# Patient Record
Sex: FEMALE
[De-identification: names, ages, dates, MRNs, and addresses within clinical notes are randomized per-mention and may not be internally consistent; named-entity substitution may affect disease eponyms.]

---

## 2020-05-21 ENCOUNTER — HOSPITAL ENCOUNTER (OUTPATIENT)
Dept: HOSPITAL 92 - L&D/OP | Age: 30
Discharge: HOME HEALTH | End: 2020-05-21
Attending: FAMILY MEDICINE
Payer: COMMERCIAL

## 2020-05-21 VITALS — BODY MASS INDEX: 29.8 KG/M2

## 2020-05-21 DIAGNOSIS — O99.613: Primary | ICD-10-CM

## 2020-05-21 DIAGNOSIS — Z3A.37: ICD-10-CM

## 2020-05-21 DIAGNOSIS — Z88.6: ICD-10-CM

## 2020-05-21 DIAGNOSIS — K80.20: ICD-10-CM

## 2020-05-21 DIAGNOSIS — O24.415: ICD-10-CM

## 2020-05-21 DIAGNOSIS — Z79.84: ICD-10-CM

## 2020-05-21 LAB
ALBUMIN SERPL BCG-MCNC: 3.5 G/DL (ref 3.5–5)
ALP SERPL-CCNC: 95 U/L (ref 40–110)
ALT SERPL W P-5'-P-CCNC: 13 U/L (ref 8–55)
ANION GAP SERPL CALC-SCNC: 11 MMOL/L (ref 10–20)
AST SERPL-CCNC: 14 U/L (ref 5–34)
BILIRUB SERPL-MCNC: 0.4 MG/DL (ref 0.2–1.2)
BUN SERPL-MCNC: 7 MG/DL (ref 7–18.7)
CALCIUM SERPL-MCNC: 8.7 MG/DL (ref 7.8–10.44)
CHLORIDE SERPL-SCNC: 106 MMOL/L (ref 98–107)
CO2 SERPL-SCNC: 23 MMOL/L (ref 22–29)
CREAT CL PREDICTED SERPL C-G-VRATE: 195 ML/MIN (ref 70–130)
GLOBULIN SER CALC-MCNC: 3.2 G/DL (ref 2.4–3.5)
GLUCOSE SERPL-MCNC: 80 MG/DL (ref 70–105)
POTASSIUM SERPL-SCNC: 4 MMOL/L (ref 3.5–5.1)
SODIUM SERPL-SCNC: 136 MMOL/L (ref 136–145)

## 2020-05-21 PROCEDURE — 36416 COLLJ CAPILLARY BLOOD SPEC: CPT

## 2020-05-21 PROCEDURE — 76705 ECHO EXAM OF ABDOMEN: CPT

## 2020-05-21 PROCEDURE — 99284 EMERGENCY DEPT VISIT MOD MDM: CPT

## 2020-05-21 PROCEDURE — 80053 COMPREHEN METABOLIC PANEL: CPT

## 2020-05-21 PROCEDURE — 36415 COLL VENOUS BLD VENIPUNCTURE: CPT

## 2020-05-21 NOTE — PDOC.LDPN
Labor & Delivery Progress Note





- Subjective


Subjective: comfortable





- Objective


Vital signs reviewed and normal: yes


General: NAD, resting


SVE: 1/thick/high





- Assessment


(1) Third trimester pregnancy


Code(s): Z34.93 - ENCNTR FOR SUPRVSN OF NORMAL PREG, UNSP, THIRD TRIMESTER   

Current Visit: Yes   Status: Acute   





(2) Gestational diabetes


Code(s): O24.419 - GESTATIONAL DIABETES MELLITUS IN PREGNANCY, UNSP CONTROL   

Current Visit: Yes   Status: Acute   





(3) Abdominal pain


Code(s): R10.9 - UNSPECIFIED ABDOMINAL PAIN   Current Visit: Yes   Status: 

Acute   





(4) Cholelithiasis


Code(s): K80.20 - CALCULUS OF GALLBLADDER W/O CHOLECYSTITIS W/O OBSTRUCTION   

Current Visit: Yes   Status: Acute   


Plan: other (OBGYN Faculty: Aware of RUQ sono finding cholelithiasis. No CBD 

dilation or sono evidence of GB wall thickening. OK for outpat care with GB 

information provided.)


-: 





Pt is a 30 yo  at 37.5 wks by 1T U/S, KIM 37.5 wks, who presents for upper 

abdominal discomfort:





# Abdominal Discomfort


Likely GERD, r/o gallbladder etiology. Pawel on monitor - will reassess 

in 2 hours for change. 


- pending CMP, RUQ U/S


- famotidine for reflux


- tylenol for pain





# Term IUP


1, thich, high on check today. eIOL 20 for A2GDM.


- will recheck in 2 hours for progression of labor


- pending 3T labs from [facility]





# A2GDM


BG WNL


- continue metformin





# Cholelithiasis


- counseled pt, pt understands symptoms and return precautions





# Vaginal Discharge


Curd-like appearing, white, non-foul smelling


- discharge with monostat





Dispo: discharge to home with return precautions given





OBGYN Faculty:


Aware of GB findings. No CBD dilation or GB wall thickening. OK to follow up 

PP. Info provided by Resident team. CMP ok.

## 2020-05-21 NOTE — PDOC.FPROB
FMR OB H&P: HPI





- History of Present Illness


Chief Complaint: Abdominal Pain


Indentification: 


History of Present Illness: 





Pt is a 30 yo  at 37.5 weeks by 1T u/s, KIM who presents with upper 

abdominal pain/discomfort worsening since last night. She states it is her 

"upper stomach". The pain is constant, not alleviated by anything although she 

has not taken any medication.  She has not had a cholecystectomy but admitted 

to an appendectomy. She has a bad taste in her mouth.  She has PMH of reflux 

not taking medication. Endorsed 1 episode of emesis. She denies LOF, vaginal 

bleeding, intense/diffuse abdominal pain, dysuria, hematuria, vaginal 

discharge.  She does not know if these are contractions.





ALANNA - SILVESTRE Potts





FMR OB H&P: Current Pregnancy





- Prenatal Care


: 1


Para: 0


Gestational age: 37.5


Due date: 20


Dating Criteria: 1T U/S


Course/Complications: 





A2GDM





FMR OB H&P: History





- Past Medical History


PMH: 


GERD





- OB History


OB History: 





A2GDM





- GYN History


GYN History: 





Hx of Possible HPV requiring re-testing after delivery





- Surgical History


Sx History: 





Appendectomy 





- Social History


Social History: 





denies tobacco, drugs, alcohol. Smoked tobacco 5 years ago.





- Family History


Family History: 





non-contributory





FMR OB H&P: Medications





- Current


Home Medications: 


 











 Medication  Instructions  Recorded  Confirmed  Type


 


metFORMIN [Glucophage] 1 tab PO DAILY 20 History











Allergies/Adverse Reactions: 


 Allergies











Allergy/AdvReac Type Severity Reaction Status Date / Time


 


NSAIDS (Non-Steroidal Allergy   Verified 20 07:44





Anti-Inflamma     














FMR OB H&P: ROS





- Review of Systems


General: denies: fever/chills, weight/appetite/sleep changes


ENT: denies: nasal congestion, rhinorrhea


Cardiovascular: denies: chest pain, edema


Gastrointestinal: reports: abdominal pain, indigestion, nausea, vomiting.  

denies: cramping, diarrhea, constipation


Genitourinary (Female): denies: incontinence, dysuria, hematuria


Musculoskeletal: denies: pain, stiffness


Neurologic: denies: numbness, syncope


Integumentary: denies: rash, lesions


Hematologic/Lymphatic: denies: prolonged or excessive bleeding





FMR OB H&P: Vital Signs





- Maternal


Vital signs: 





BP WNL





- Fetal Heart Tones


Variability: moderate


Acceleration: present


Category: category 1


[place] contractions every: q3-4 mins





FMR OB H&P: Physical Exam





- Physical Exam


General: NAD, awake, alert and oriented


HEENT: PERRLA, EOMI


Neck: FROM, no JVD


Heart: RRR, normal S1/S2


General: CTAB, no respiratory distress, no wheezing


Abdomen: soft, gravid


Deviation from normal: mild tenderness in epigastric region, negative martinez's 

sign


Musculoskeletal: FROM in all four extremities


Neurological: cranial nerves II through XII intact, sensation to pain,touch and 

proprioception grossly normal


Skin: no rash, capillary refill <2 seconds


Lymphatic: no purpura, no petechia


Psychiatric: intact recent and remote memory, good judgement and insight





FMR OB H&P: Results





- Labs


Lab results: 


 Laboratory Results - last 24 hr











  20





  07:43


 


POC Glucose  86














FMR OB H&P: A/P





- Problem List


(1) Third trimester pregnancy


Current Visit: Yes   Status: Acute   Code(s): Z34.93 - ENCNTR FOR SUPRVSN OF 

NORMAL PREG, UNSP, THIRD TRIMESTER   





(2) Gestational diabetes


Current Visit: Yes   Status: Acute   Code(s): O24.419 - GESTATIONAL DIABETES 

MELLITUS IN PREGNANCY, UNSP CONTROL   





(3) Abdominal pain


Current Visit: Yes   Status: Acute   Code(s): R10.9 - UNSPECIFIED ABDOMINAL 

PAIN   


Disposition: 





Pt is a 30 yo  at 37.5 wks by 1T U/S, KIM 37.5 wks, who presents for upper 

abdominal discomfort:





# Abdominal Discomfort


Likely GERD, r/o gallbladder etiology. Pawel on monitor - will reassess 

in 2 hours for change. 


- pending CMP, RUQ U/S


- famotidine for reflux


- tylenol for pain





# Term IUP


1, thich, high on check today. eIOL 20 for A2GDM.


- will recheck in 2 hours for progression of labor


- pending 3T labs from [facility]





# A2GDM


BG WNL


- continue metformin





Dispo: pending workup, assess for cervical change in 2 hours.


Discussion: 


Date/Time: 20 0901











This H&P was discussed with Dr. Andersen and Dr. Jackson who agree with the above 

documentation and plan.

## 2020-05-21 NOTE — ULT
Exam:

Right upper quadrant ultrasound:



HISTORY:

Right-sided abdominal pain and right upper and right lower quadrant.



COMPARISON:

None



FINDINGS:

Liver: Within normal limits

Gallbladder: Echogenic foci are seen in the gallbladder lumen which demonstrate posterior shadowing w
ith additional areas of increased echogenicity. Findings are most likely attributable to

combination of gallbladder calculi, cholesterol crystals, and sludge within the gallbladder lumen. No
 gallbladder wall thickening or pericholecystic fluid is seen.

Common bile duct: The common duct is normal in caliber  measuring 0.3 cm in diameter.

Pancreas: Limited visualized portions of the pancreas demonstrate a normal sonographic appearance.



Right kidney: Right kidney demonstrates a normal sonographic appearance without evidence of hydroneph
rosis. The right kidney measures 11.5 cm in length.

IVC: The visualized IVC demonstrates a normal sonographic appearance.



Limited sonographic evaluation was obtained of the right lower quadrant region of patient's area of p
ain. There is a hypoechoic rounded structure measuring 4.2 cm x 3.7 cm x 2.2 cm. This structure

does not demonstrate flow on color flow or Doppler evaluation. The exact etiology of this structure i
s uncertain. This structure is located within the superior facial portion of the anterior right

lower quadrant. This structure does not elongate to suggest that this represents the appendix.



IMPRESSION:



1. Hypoechoic structure right lower quadrant region of patient's area of pain. This does not elongate
 or demonstrate a tubular configuration to definitely suggest that this represents the appendix.

The exact etiology is uncertain. Additional imaging with CT or MRI would be warranted for further gordo
luation of this structure. Appendicitis cannot be excluded based on this examination.

2. Cholelithiasis and gallbladder sludge. Common duct is normal in caliber.

3. No evidence of hydronephrosis



Reported By: Cleve Hoyos 

Electronically Signed:  5/21/2020 10:43 AM

## 2020-05-21 NOTE — HP
TIME OF EVALUATION:  The time of evaluation was roughly 0920 until 0940.  The 
time

is now 0944. 



LOCATION:  Triage bed A. 



In brief, this is a patient of the prenatal clinic and I have discussed the case

with Lai Waller, who is a resident on call. 



CHIEF COMPLAINT:  Possible gastroesophageal reflux symptoms (which have been

chronic). 



HISTORY OF PRESENT ILLNESS:  This is a 29-year-old G1 at 37 weeks and 5 days, 
who

has a scheduled induction of labor on May 30th at around 39 weeks.  She has A2

diabetes and she is on metformin.  She states that she has had this persistent

reflux symptom in her mid chest, but there is no shortness of breath and no true

chest pain.  There is no diaphoresis.  There is no loss of consciousness.  She 
also

has a "bad taste" in her mouth, which to her feels like "reflux."  There is no

leakage of fluid.  No contractions as well.  She notes a few contractions.  She 
does

not state that they are regular and that is not her presenting complaint. 



REVIEW OF SYSTEMS:  Complete review of systems was checked and is otherwise 
negative

unless specified in the HPI. 



PAST MEDICAL HISTORY:  Otherwise negative.



OB HISTORY:  She is a G1, P0.



PAST SURGICAL HISTORY:  Prior Appy.



SOCIAL HISTORY:  Negative for alcohol, tobacco, or drug use.



ALLERGIES:  NONE.



PHYSICAL EXAMINATION:

VITAL SIGNS:  She is afebrile and normotensive.  Blood sugar at bedside was 86. 

Abd soft and NT.

Her cervix is1, thick and high. 



FETAL MONITORS: Tocodynamometer shows irregular contractions about every 4 
minutes or so, and the

fetal heart tracing is reactive with accelerations above a baseline of about 130

with moderate variability.  There are no pathological decelerations.  



ASSESSMENT:  This is a G1, P0, at 37 weeks and 5 days, 29-year-old, with 
epigastric

reflux symptoms.  There are no symptoms of biliary colic or of true chest pain. 



PLAN:  

1. To be conservative, I have requested Dr. Waller get a complete metabolic 
profile

and a right upper quadrant ultrasound to rule out any atypical presentations of

hepatic conditions. 

2. We will check her cervix in 2 hours.

3. We will order Pepcid for conservative care.

4. I have seen the patient at bedside and she is clinically stable.

5. I have explained the plan to the patient in detail.







Job ID:  [id number]



[facility]

## 2020-05-28 ENCOUNTER — HOSPITAL ENCOUNTER (OUTPATIENT)
Dept: HOSPITAL 92 - L&D/OP | Age: 30
Discharge: HOME HEALTH | End: 2020-05-28
Payer: COMMERCIAL

## 2020-05-28 VITALS — DIASTOLIC BLOOD PRESSURE: 71 MMHG | SYSTOLIC BLOOD PRESSURE: 112 MMHG | TEMPERATURE: 98.2 F

## 2020-05-28 VITALS — BODY MASS INDEX: 30.9 KG/M2

## 2020-05-28 DIAGNOSIS — O36.8130: Primary | ICD-10-CM

## 2020-05-28 DIAGNOSIS — Z88.6: ICD-10-CM

## 2020-05-28 DIAGNOSIS — Z3A.38: ICD-10-CM

## 2020-05-28 DIAGNOSIS — O24.415: ICD-10-CM

## 2020-05-28 PROCEDURE — 76819 FETAL BIOPHYS PROFIL W/O NST: CPT

## 2020-05-28 PROCEDURE — 76815 OB US LIMITED FETUS(S): CPT

## 2020-05-28 PROCEDURE — 99283 EMERGENCY DEPT VISIT LOW MDM: CPT

## 2020-05-28 NOTE — ULT
EXAM:

Limited OB ultrasound



COMPARISON:

None



HISTORY:

Pregnant female. Evaluate fetal size. Decreased fetal movement..



TECHNIQUE: Multiplanar grayscale and color Doppler transabdominal sonographic images are obtained.



FINDINGS: There is a single intrauterine gestation in cephalic presentation. Cardiac Doppler demonstr
ates fetal heart tones with a fetal heart rate of 135 beats per minute. The placenta is located

maternal right without evidence of placenta previa. There is a normal amount of amniotic fluid with a
n amniotic fluid index of 10 centimeters. Cervix not visualized due to shadowing from fetal head.



Fetal biometry measurements:

BPD  8.95  cm -- 36 weeks 2 days

HC  33.32  cm -- 38 weeks

AC  33.51  cm -- 37 weeks 3 days

FL  7.2  cm -- 36 weeks 6 days



The estimated gestational age by ultrasound is 37 weeks 1 day with an KIM on6/17/2020. Gestational ag
e by the last menstrual period is not provided.



The estimated fetal weight by ultrasound is 3149 g (6 pounds, 15 ounces). 



This exam was not performed for evaluation of the fetal anatomical structures.



A score of 2 was obtained each for fetal tone, fetal breathing, fetal movements, and amniotic fluid v
olume



IMPRESSION:

1. Single intrauterine gestation in cephalic  presentation with fetal heart tones documented. Estimat
ed gestational age by ultrasound is 37 weeks 1 day with KIM on 6/17/2020.

2. Estimated fetal weight is 3149 g (6 pounds, 15 ounces).

3. Amniotic fluid index is 10 centimeters.

4. Total biophysical profile score of 8 out of 8 is obtained.



Reported By: Cleve Hoyos 

Electronically Signed:  5/28/2020 2:18 PM

## 2020-05-28 NOTE — PDOC.LDHP
Labor and Delivery H&P


Chief complaint: decreased fetal movement


HPI: 


30yo  @ 38.5 wks by LMP and 6.5 wk sono presents from clinic with 

complaint of decreased fetal movement x2 days and an incomplete BPP of 2. Pt 

states she has been feeling normal, denies any vaginal pain/bleeding/discharge, 

LOF. Does note feeling some very mild contractions intermittently. Pt has 

induction scheduled at 39 weeks. She is a GDM A2 and takes metformin 500 qam 

with glucose ranging from  recently.


Current gestational age (weeks): 38 (.5)


Dating criteria: last menstrual period, first trimester ultrasound


Grav: 1


Para: 0


Current pregnancy complications: gestational diabetes (A2)


Abnormal US findings: No


Current medications: pre- vitamins


Previous surgical history: appendectomy


Allergies/Adverse Reactions: 


 Allergies











Allergy/AdvReac Type Severity Reaction Status Date / Time


 


naproxen Allergy   Verified 20 12:34


 


NSAIDS (Non-Steroidal Allergy   Verified 20 07:44





Anti-Inflamma     











Social history: none





- Physical Exam


Vital signs reviewed and normal: yes


General: NAD


Heart: RRR


Lungs: nonlabored breathing


Abdomen: NTTP


Extremeties: normal range of motion


FHT: category 1, acceleration absent, variability present





- OB Labs


Blood type: B


RH: positive


Antibody Screen: negative


HIV: negative


RPR: negative


HEPSAg: negative


1 hour GCT: positive (171)


3 hour GTT: 69/174/155/118


GBS: negative


Rubella: immune


Additional Labs: 


hep c neg


A1c 5.3%





- Assessment


Encounter of 3rd Trimester Pregnancy - no abnormal findings





- Plan


-: 


Concern for reported abnormal BPP and Decreased Fetal Movement


BPP:


FRANCISCO: 10


NST: reactive


Growth: EFW 3149g, EGA 37.1





Pt reports no concerning symptoms. GDM appears well controlled. BPP done in the 

office today that was 2 was over a 20 minute timeframe and thus incomplete - 

no NST was performed at that time. Pt is considered stable for discharge and 

follow up on her induction date at 39 weeks. Discussed return precautions prior 

to discharge - pt and FOB expressed understanding.





Roshan Wilson PGY1





This case was discussed my attending, Dr. Scott Tabor, who agreed with 

the plan.





Addendum - Attending





- Attending Attestation


Date/Time: 20 2969





Seen and examined on date of service. BPP in office was reportedly only for 20 

minutes and no NST performed. Repeat growth and APT reassuring. D/c with follow 

up for induction @ 39/0.

## 2020-05-29 ENCOUNTER — HOSPITAL ENCOUNTER (OUTPATIENT)
Dept: HOSPITAL 92 - ERS | Age: 30
Discharge: HOME | End: 2020-05-29
Attending: FAMILY MEDICINE
Payer: MEDICAID

## 2020-05-29 DIAGNOSIS — Z01.812: Primary | ICD-10-CM

## 2020-05-29 DIAGNOSIS — Z11.59: ICD-10-CM

## 2020-05-29 PROCEDURE — U0003 INFECTIOUS AGENT DETECTION BY NUCLEIC ACID (DNA OR RNA); SEVERE ACUTE RESPIRATORY SYNDROME CORONAVIRUS 2 (SARS-COV-2) (CORONAVIRUS DISEASE [COVID-19]), AMPLIFIED PROBE TECHNIQUE, MAKING USE OF HIGH THROUGHPUT TECHNOLOGIES AS DESCRIBED BY CMS-2020-01-R: HCPCS

## 2020-05-29 PROCEDURE — 87635 SARS-COV-2 COVID-19 AMP PRB: CPT

## 2020-05-30 ENCOUNTER — HOSPITAL ENCOUNTER (INPATIENT)
Dept: HOSPITAL 92 - L&D | Age: 30
LOS: 3 days | Discharge: HOME | End: 2020-06-02
Attending: FAMILY MEDICINE | Admitting: FAMILY MEDICINE
Payer: COMMERCIAL

## 2020-05-30 VITALS — BODY MASS INDEX: 29.6 KG/M2

## 2020-05-30 DIAGNOSIS — Z79.84: ICD-10-CM

## 2020-05-30 DIAGNOSIS — Z3A.39: ICD-10-CM

## 2020-05-30 DIAGNOSIS — A63.0: ICD-10-CM

## 2020-05-30 LAB
HBSAG INDEX: 0.33 S/CO (ref 0–0.99)
HGB BLD-MCNC: 12.6 G/DL (ref 12–16)
MCH RBC QN AUTO: 29.2 PG (ref 27–31)
MCV RBC AUTO: 93.1 FL (ref 78–98)
PLATELET # BLD AUTO: 414 THOU/UL (ref 130–400)
RBC # BLD AUTO: 4.33 MILL/UL (ref 4.2–5.4)
WBC # BLD AUTO: 11.1 THOU/UL (ref 4.8–10.8)

## 2020-05-30 PROCEDURE — 82805 BLOOD GASES W/O2 SATURATION: CPT

## 2020-05-30 PROCEDURE — 86901 BLOOD TYPING SEROLOGIC RH(D): CPT

## 2020-05-30 PROCEDURE — U0003 INFECTIOUS AGENT DETECTION BY NUCLEIC ACID (DNA OR RNA); SEVERE ACUTE RESPIRATORY SYNDROME CORONAVIRUS 2 (SARS-COV-2) (CORONAVIRUS DISEASE [COVID-19]), AMPLIFIED PROBE TECHNIQUE, MAKING USE OF HIGH THROUGHPUT TECHNOLOGIES AS DESCRIBED BY CMS-2020-01-R: HCPCS

## 2020-05-30 PROCEDURE — 86850 RBC ANTIBODY SCREEN: CPT

## 2020-05-30 PROCEDURE — 36415 COLL VENOUS BLD VENIPUNCTURE: CPT

## 2020-05-30 PROCEDURE — 86762 RUBELLA ANTIBODY: CPT

## 2020-05-30 PROCEDURE — 85018 HEMOGLOBIN: CPT

## 2020-05-30 PROCEDURE — 86900 BLOOD TYPING SEROLOGIC ABO: CPT

## 2020-05-30 PROCEDURE — 87340 HEPATITIS B SURFACE AG IA: CPT

## 2020-05-30 PROCEDURE — 85027 COMPLETE CBC AUTOMATED: CPT

## 2020-05-30 PROCEDURE — 51702 INSERT TEMP BLADDER CATH: CPT

## 2020-05-30 PROCEDURE — 85014 HEMATOCRIT: CPT

## 2020-05-30 PROCEDURE — 36416 COLLJ CAPILLARY BLOOD SPEC: CPT

## 2020-05-30 PROCEDURE — 87635 SARS-COV-2 COVID-19 AMP PRB: CPT

## 2020-05-30 NOTE — PDOC.FPROB
FMR OB H&P: Medications





- Current


Home Medications: 


 











 Medication  Instructions  Recorded  Confirmed  Type


 


metFORMIN [Glucophage] 1 tab PO DAILY 20 History











Allergies/Adverse Reactions: 


 Allergies











Allergy/AdvReac Type Severity Reaction Status Date / Time


 


naproxen Allergy   Verified 20 20:25


 


NSAIDS (Non-Steroidal Allergy   Verified 20 20:25





Anti-Inflamma     














FMR OB H&P: A/P





- Problem List


(1) Gestational diabetes


Status: Acute   Code(s): O24.419 - GESTATIONAL DIABETES MELLITUS IN PREGNANCY, 

UNSP CONTROL   





(2) Third trimester pregnancy


Status: Acute   Code(s): Z34.93 - ENCNTR FOR SUPRVSN OF NORMAL PREG, UNSP, 

THIRD TRIMESTER   


Discussion: 


Date/Time: 20





PCP: Harry


 


HPI:


This is a 28 yo  presenting for induction 2/2 A2GDM.


She affirms fetal movement, denies cxns, ROM, bleeding.


Denies HA, visual changes, SOB, or swelling.





She had incomplete care as she moved here from [place] at 24 weeks but then 

returned from [place] for >1 month and has now returned.





History:


OB hx:  G1


Gyn: LSIL with HR HPV


PMH: denies hx HTN, DM, asthma


PSH: appendectomy


Meds: PNV, metformin 500mg BID


All:  ibuprofen, naproxen-> facial swelling


Soc Hx: denies smoking, alcohol, drugs


Fam Hx: denies downs, congenital birth defects





GBS: neg


Blood type: B+


Ab screen: neg


HIV: neg


RPR: neg


Hep B: neg


Rubella: immune


GC/CT: neg





REVIEW OF SYSTEMS: 


Gen: no fever, chills, or sweats


Neuro: no numbness/tingling, no weakness, denies headache


ENT: denies congestion


Eyes: no visual changes


Resp: denies cough, no production, no SOB, no wheeze


Card: denies chest pain, no palpitations


GI: denies nausea, vomiting, diarrhea


: no dysuria, no hematuria


Skin: no rash, no erythema


Psych: denies hx anxiety/depression





Vitals:  T: 98.5   R: 18   BP: 114/76 P:67 at: 98% on RA  





PHYSICAL EXAMINATION: 


General: NAD, alert and oriented x3


HEENT: EOMI, normal sclera


Neck: Supple. Full ROM.


Heart/Cardiovascular System: RRR, Cap refill < 3 seconds, no rub, no murmur


Lungs/Respiratory System: clear to auscultation bilaterally. No increased work 

of breathing. Room air.


Abdomen/Gastro-Intestinal System: no abdominal tenderness, normal bowel sounds, 

Gravid


Extremities: Warm extremities. No cyanosis or edema. 


Neuro: No gross deficits appreciated


Psychiatry: Awake, Alert and cooperative with exam


Skin: no lesions, no rashes


Musculoskeletal: Full ROM





A/P:





This is a 28 yo  here for induction 2/2 A2GDM


FHT: 140 baseline, mod variability, no decels, accels present


[place]: irregular contractions





# Pregnancy


-   SVE: cl/50%/-2


-   Cytotec induction


-   GBS neg





# A2GDM


-   Check glucose q4 hr, goal <126


-   Metformin 500mg BID


-                      3106g at 36.5wk US





# LSIL w/ HR HPV


-   Colpo 6 wk PP








Addendum - Attending





- Attending Attestation


Date/Time: 20





I personally evaluated the patient and discussed the management with Dr. Potts.


I agree with the History, Examination, Assessment and Plan documented above 

with any addition or exceptions noted below.

## 2020-05-31 LAB
ANALYZER IN CARDIO: (no result)
BASE EXCESS STD BLDA CALC-SCNC: -5 MEQ/L
HCO3 BLDA-SCNC: 23.1 MEQ/L (ref 22–28)

## 2020-05-31 PROCEDURE — 6A550ZT PHERESIS OF CORD BLOOD STEM CELLS, SINGLE: ICD-10-PCS | Performed by: FAMILY MEDICINE

## 2020-05-31 PROCEDURE — 3E033VJ INTRODUCTION OF OTHER HORMONE INTO PERIPHERAL VEIN, PERCUTANEOUS APPROACH: ICD-10-PCS | Performed by: FAMILY MEDICINE

## 2020-05-31 PROCEDURE — 0KQM0ZZ REPAIR PERINEUM MUSCLE, OPEN APPROACH: ICD-10-PCS | Performed by: FAMILY MEDICINE

## 2020-05-31 RX ADMIN — DOCUSATE CALCIUM SCH MG: 240 CAPSULE, LIQUID FILLED ORAL at 21:21

## 2020-05-31 NOTE — PDOC.LDPN
Labor & Delivery Progress Note





- Subjective


Subjective: comfortable





- Objective


Vital signs reviewed and normal: yes


General: NAD, resting, breathing through contractions


Uterine fundus: non tender


SVE: @ 00:30


Dilation: 1


Effacement: 75%


Station: -2


FHT: category 1


[place] contractions every: 2 minutes


Plan: continue plan of care, other (Will place cytotec #2 when contractions 

space out further. )


-: 





This is a 28 yo  here for induction 2/2 A2GDM


FHT: 140 baseline, mod variability, no decels, accels present


[place]: contractions q2 min





# Pregnancy


-   SVE: cl/50%/-2 @ 20:00


-   SVE: 1/75/-2 @ 00:30 


-   Cytotec induction


-   GBS neg





# A2GDM


-   Check glucose q4 hr, goal <126


-   Metformin 500mg BID


-                  3106g at 36.5wk US





# LSIL w/ HR HPV


-   Colpo 6 wk PP

## 2020-05-31 NOTE — PDOC.OPDEL
OB Operative/Delivery Note





- Additional Findings/Plan


Compilations/Other Findings: 





Vaginal Delivery Procedure note 





Delivering Physician: Dr. Cortes Potts


Attending: Dr. Scott Tabor


Procedure: Spontaneous Vaginal Delivery 


Anesthesia: epidural 


QBL:79 ml 





Pre-op Diagnosis: 


1. Term intrauterine pregnancy induction 


2. A2GDM


3. LSIL w/ HR HPV





Post-op Diagnosis: 


1. Term intrauterine pregnancy, delivered 


2. same as above 





Indications: A 30y/o female  presented for induction





Delivery Note: This is 30yo F now  @ 39.1wks who delivered a viable F 

infant at 0943 on . Following an uneventful antepartum course, a vigorous 

female was delivered over an intact perineum in the occipitoanterior position. 

Anterior Shoulder and then remainder of the body delivered. No nuchal cord. The 

head was held down and mouth and nares were bulb suctioned. Cord clamped and 

cut and cord blood collected. Placenta delivered intact in the [place] 

presentation with a 3 vessel cord noted. Fundal massage was performed and the 

fundus was firm. The cervix and vagina were inspected and a 2nd degree perineal 

laceration was appreciated. Repaired with 2-0 vicryl in the usual fashion with 

good approximation and hemostasis. Infant went to  nursery in good 

condition for routine care. Apgars were 7/9 at 1 & 5 minutes, respectively. 

Patient tolerated delivery well and went to postpartum after routine recovery/

care.





Addendum - Attending





- Attending Attestation


Date/Time: 20 5800





I personally evaluated the patient and discussed the management with Dr. Potts.


I agree with the History, Examination, Assessment and Plan documented above 

with any addition or exceptions noted below.

## 2020-05-31 NOTE — PDOC.LDPN
Labor & Delivery Progress Note





- Subjective


Subjective: comfortable, painful contractions





- Objective


Vital signs reviewed and normal: yes


General: NAD, resting, breathing through contractions


Uterine fundus: non tender


SVE: @ 0330 by RN 


Dilation: 3/80/-1 


FHT: category 2 (Baseline 120, few early decels present, acells present )


[place] contractions every: 2min 


Other exam findings: SROM 0130


Procedures: EPIDURAL 0430


Plan: continue plan of care


-: 





This is a 28 yo  here for induction 2/2 A2GDM


FHT: 140 baseline, mod variability, no decels, accels present


[place]: contractions q2 min





# Term sIUP 


- SVE: cl/50%/-2 @ 20:00


- SVE: /-2 @ 00:30 


- SVE 3/80/-1 @ 0330, wanting epidural 


- Cytotec induction


- GBS neg





# A2GDM


- Category 2 tracing. BG 72, changed fluids from LR to D5 LR @ 125. 


- Check glucose q2 hr, goal <126


- Metformin 500mg BID


- 3106g at 36.5wk US





# LSIL w/ HR HPV


-   Colpo 6 wk PP

## 2020-06-01 LAB — HGB BLD-MCNC: 11.8 G/DL (ref 12–16)

## 2020-06-01 RX ADMIN — DOCUSATE CALCIUM SCH MG: 240 CAPSULE, LIQUID FILLED ORAL at 09:53

## 2020-06-01 RX ADMIN — DOCUSATE CALCIUM SCH MG: 240 CAPSULE, LIQUID FILLED ORAL at 21:45

## 2020-06-01 NOTE — PDOC.PP
Post Partum Progress Note


Post Partum Day #: 1


Subjective: 





Mom states she is feeling well, able to walk to restroom. Tolerating PO. 

Passing gas. No difficulty with urination. Bleeding less than menses.


PO intake tolerated: yes


Flatus: yes


Ambulation: yes


 Vital Signs (12 hours)











  Temp Pulse Resp BP Pulse Ox


 


 06/01/20 00:02  98.6 F  75  18  109/72  96


 


 05/31/20 20:00  98.6 F  76  18  94/61  94 L








 Weight











Weight                         73.482 kg

















- Physical Examination


General: NAD


Cardiovascular: no m/r/g, RRR


Respiratory: clear to auscultation bilaterally, non-labored breathing


Abdominal: appropriately TTP


Fundus firm & at: 1 cm below umbilicus


Extremities: negative homans (B)


Neurological: no gross focal deficits


Psychiatric: A&Ox3, normal affect


Result Diagrams: 


 05/30/20 22:22





Additional Labs: 


 Post Partum Labs











Blood Type  B POSITIVE   05/30/20  23:26    


 


Hep Bs Antigen  Non-Reactive S/CO (NonReactive)   05/30/20  22:22    











(1) Gestational diabetes


Code(s): O24.419 - GESTATIONAL DIABETES MELLITUS IN PREGNANCY, UNSP CONTROL   

Status: Acute   





(2) Third trimester pregnancy


Code(s): Z34.93 - ENCNTR FOR SUPRVSN OF NORMAL PREG, UNSP, THIRD TRIMESTER   

Status: Acute   





- Assessment/Plan





# PPD1


- H/H pending


- tylenol, norco for pain control


- tolerating PO





# A2GDM


-hold metformin, glucose appropriate, ACHS





# LSIL w/ HR HPV


- Colpo 6 wk PP

## 2020-06-02 VITALS — SYSTOLIC BLOOD PRESSURE: 103 MMHG | TEMPERATURE: 97.8 F | DIASTOLIC BLOOD PRESSURE: 66 MMHG

## 2020-06-02 RX ADMIN — DOCUSATE CALCIUM SCH MG: 240 CAPSULE, LIQUID FILLED ORAL at 08:16

## 2020-06-02 NOTE — PDOC.PP
Post Partum Progress Note


Post Partum Day #: 2


Subjective: 





Pain well controlled, minimal bleeding, tolerating PO, had BM. Assisted patient 

with breastfeeding, provided education and encouragement.


PO intake tolerated: yes


Flatus: yes


Ambulation: yes


 Vital Signs (12 hours)











  Temp Pulse Resp BP Pulse Ox


 


 06/01/20 20:00  98.8 F  62  16  120/77  96








 Weight











Weight                         73.482 kg

















- Physical Examination


General: NAD


Cardiovascular: no m/r/g, RRR


Respiratory: clear to auscultation bilaterally, non-labored breathing


Abdominal: + bowel sounds, lochia, no distention, appropriately TTP


Extremities: negative homans (B)


Skin: no rash


Neurological: no gross focal deficits


Psychiatric: A&Ox3, normal affect


Result Diagrams: 


 06/01/20 05:48





Additional Labs: 


 Post Partum Labs











Blood Type  B POSITIVE   05/30/20  23:26    


 


Hep Bs Antigen  Non-Reactive S/CO (NonReactive)   05/30/20  22:22    











(1) Gestational diabetes


Code(s): O24.419 - GESTATIONAL DIABETES MELLITUS IN PREGNANCY, UNSP CONTROL   

Status: Acute   





(2) Third trimester pregnancy


Code(s): Z34.93 - ENCNTR FOR SUPRVSN OF NORMAL PREG, UNSP, THIRD TRIMESTER   

Status: Acute   





(3) Postpartum care and examination


Code(s): Z39.2 - ENCOUNTER FOR ROUTINE POSTPARTUM FOLLOW-UP   Status: Acute   





- Assessment/Plan





# PPD2


- Hgb 11.8


- tylenol, norco for pain control


- tolerating PO





# A2GDM


-hold metformin, glucose appropriate





# LSIL w/ HR HPV


- Colpo 6 wk PP, discussed with patint





Birth Control: progestin


D/c today, precautions discussed
